# Patient Record
Sex: MALE | Race: WHITE | NOT HISPANIC OR LATINO | URBAN - NONMETROPOLITAN AREA
[De-identification: names, ages, dates, MRNs, and addresses within clinical notes are randomized per-mention and may not be internally consistent; named-entity substitution may affect disease eponyms.]

---

## 2020-01-23 ENCOUNTER — IMPORTED ENCOUNTER (OUTPATIENT)
Dept: URBAN - NONMETROPOLITAN AREA CLINIC 1 | Facility: CLINIC | Age: 65
End: 2020-01-23

## 2020-01-23 PROBLEM — H52.4: Noted: 2020-01-23

## 2020-01-23 PROBLEM — E11.9: Noted: 2020-01-23

## 2020-01-23 PROBLEM — H01.024: Noted: 2020-01-23

## 2020-01-23 PROBLEM — H25.13: Noted: 2020-01-23

## 2020-01-23 PROBLEM — H16.223: Noted: 2020-01-23

## 2020-01-23 PROBLEM — H01.021: Noted: 2020-01-23

## 2020-01-23 PROCEDURE — 99213 OFFICE O/P EST LOW 20 MIN: CPT

## 2020-01-23 PROCEDURE — 92015 DETERMINE REFRACTIVE STATE: CPT

## 2020-01-23 NOTE — PATIENT DISCUSSION
Deangelo OU- Discussed diagnosis in detail with patient- Discussed signs and symptoms of progression- Discussed UV protection- No treatment needed at this time - Progression noted today - Continue to monitorNIDDM 2012 with history of NPDR OU - Discussed diagnosis in detail with patient - Stressed importance of good blood sugar  - Recommend no sodas - Patient states last A1C was 6.8 and last blood sugae was 142- Dot blots noted OU last visit resolved OU on today's exam - OCT done previosly ordered by Dr. Stephan Rodas no edema noted on OCT OU- Optos done previously shows dot blots OU but stable - Letter to [facility] - Continue to monitorBlepharitis OU - Discussed diagnosis in detail with patient- Recommend patient using [employer] J baby shampoo to scrub lid daily- Continue to monitorDES OU- Discussed diagnosis in detail with patient- Discussed signs and symptoms of progression- Recommend patient drinking plenty of water and starting Omega 3’s - Recommend Refresh or Systane  throughout the day- Consider Restasis or plugs in the future if no improvement- Samples of Systane complete given today - Continue to monitorHyperopia/Astigmatism/Presbyopia OU- Discussed diagnosis in detail with patient- New glasses Rx given today - Continue to monitor; 's Notes: MR 1/23/20DFE 1/23/20OCT 3/22/18Optos 8/17/18

## 2021-08-11 ENCOUNTER — IMPORTED ENCOUNTER (OUTPATIENT)
Dept: URBAN - NONMETROPOLITAN AREA CLINIC 1 | Facility: CLINIC | Age: 66
End: 2021-08-11

## 2021-08-11 PROBLEM — H25.813: Noted: 2021-08-11

## 2021-08-11 PROBLEM — H01.024: Noted: 2021-08-11

## 2021-08-11 PROBLEM — H16.223: Noted: 2021-08-11

## 2021-08-11 PROBLEM — H52.4: Noted: 2021-08-11

## 2021-08-11 PROBLEM — H01.021: Noted: 2021-08-11

## 2021-08-11 PROBLEM — E11.3292: Noted: 2021-08-11

## 2021-08-11 PROBLEM — E11.9: Noted: 2021-08-11

## 2021-08-11 PROCEDURE — 92015 DETERMINE REFRACTIVE STATE: CPT

## 2021-08-11 PROCEDURE — 99213 OFFICE O/P EST LOW 20 MIN: CPT

## 2021-08-11 NOTE — PATIENT DISCUSSION
Cataracts OU- Discussed diagnosis in detail with patient- Discussed signs and symptoms of progression- Discussed UV protection- No treatment needed at this time - Continue to monitorNIDDM 2012 with history of NPDR OU - Discussed diagnosis in detail with patient - Stressed importance of good blood sugar  - Recommend no sodas - Patient states last A1C was 7.22- 1 MA noted in OS today - OCT done previosly ordered by Dr. Ada Arshad no edema noted on OCT OU- Optos done previously shows dot blots OU but stable - Letter to [facility] - Continue to monitorBlepharitis OU - Discussed diagnosis in detail with patient- Recommend patient using J [employer] J baby shampoo to scrub lid daily- Continue to monitorDES OU- Discussed diagnosis in detail with patient- Discussed signs and symptoms of progression- Recommend patient drinking plenty of water and starting Omega 3’s - Recommend Refresh or Systane  throughout the day- Consider Restasis or plugs in the future if no improvement- Samples of Systane complete given previously- Continue to monitorHyperopia/Astigmatism/Presbyopia OU- Discussed diagnosis in detail with patient- New glasses RX given today  - Continue to monitor; 's Notes: MR 1/23/20DFE 1/23/20OCT 3/22/18Optos 8/17/18

## 2022-04-10 ASSESSMENT — VISUAL ACUITY
OS_SC: 20/30-
OS_SC: 20/20
OD_SC: 20/30-1
OD_SC: 20/30-

## 2022-04-10 ASSESSMENT — TONOMETRY
OD_IOP_MMHG: 14
OS_IOP_MMHG: 14
OS_IOP_MMHG: 14
OD_IOP_MMHG: 14

## 2022-05-17 ENCOUNTER — EMERGENCY VISIT (OUTPATIENT)
Dept: URBAN - NONMETROPOLITAN AREA CLINIC 1 | Facility: CLINIC | Age: 67
End: 2022-05-17

## 2022-05-17 DIAGNOSIS — H35.81: ICD-10-CM

## 2022-05-17 DIAGNOSIS — E11.3293: ICD-10-CM

## 2022-05-17 PROCEDURE — 92134 CPTRZ OPH DX IMG PST SGM RTA: CPT

## 2022-05-17 PROCEDURE — 99214 OFFICE O/P EST MOD 30 MIN: CPT

## 2022-05-17 ASSESSMENT — TONOMETRY
OD_IOP_MMHG: 12
OS_IOP_MMHG: 12

## 2022-05-17 ASSESSMENT — VISUAL ACUITY
OS_CC: 20/40 BLURRY
OD_CC: 20/40+
OD_PH: 20/30
OD_BAT: 20/30-1
OS_BAT: 20/40-1

## 2022-05-17 NOTE — PATIENT DISCUSSION
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 Stressed the importance of strong glucose control. 2-3 MA's noted today in OS. Recommend consult with [facility], patient agrees with plan.

## 2022-09-12 NOTE — PROCEDURE NOTE: CLINICAL
PROCEDURE NOTE: Removal of Corneal FB at Slit Lamp OU. Diagnosis: Corneal Foreign Body. Anesthesia: Topical. The patient, the procedure, and the correct site were identified initially. Prior to treatment, the risks/benefits/alternatives were discussed. The patient wished to proceed with procedure. Corneal foreign body was removed using a 25 gauge needle at the slit lamp. Patient tolerated procedure well. There were no complications. Post-op instructions given. The residual rust ring was carefully buffed out of the corneal stroma. Patient tolerated procedure well. Florina Pollen

## 2022-09-14 NOTE — PATIENT DISCUSSION
CPM Ciprofloxacin drops QID OU x 4 days then D/C, consider restarting if need to continue to remove rust.

## 2022-09-14 NOTE — PATIENT DISCUSSION
Good appearance today, epi defects healing after FB removal on Monday. Small amount of rust removed at slit lamp OS today.

## 2022-09-20 NOTE — PATIENT DISCUSSION
Good appearance today, stable OD, small rust/fragment more superficial OS today. Discussed additional removal today vs waiting for more potential anterior movement, agreed to recheck next week and plan to remove final fragment if not dislodged on its own.

## 2022-09-22 ENCOUNTER — ESTABLISHED PATIENT (OUTPATIENT)
Dept: URBAN - NONMETROPOLITAN AREA CLINIC 1 | Facility: CLINIC | Age: 67
End: 2022-09-22

## 2022-09-22 DIAGNOSIS — H52.4: ICD-10-CM

## 2022-09-22 PROCEDURE — 92014 COMPRE OPH EXAM EST PT 1/>: CPT

## 2022-09-22 PROCEDURE — 92015 DETERMINE REFRACTIVE STATE: CPT

## 2022-09-22 ASSESSMENT — VISUAL ACUITY
OD_CC: 20/40+1
OS_CC: 20/40+2

## 2022-09-22 ASSESSMENT — TONOMETRY
OS_IOP_MMHG: 16
OD_IOP_MMHG: 16

## 2022-09-22 NOTE — PATIENT DISCUSSION
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 Stressed the importance of strong glucose control. 2-3 MA's noted today in OS. Patient is being followed by [facility].

## 2022-10-12 NOTE — PATIENT DISCUSSION
Good appearance today, stable OD, small rust/fragment more superficial OS today. Discussed additional removal today vs waiting for more potential anterior movement, agreed to recheck next week and plan to remove final fragment if not dislodged on its own. Soft, non-tender, no hepatosplenomegaly, normal bowel sounds

## 2022-10-12 NOTE — PATIENT DISCUSSION
ATs as needed currently OU. Muscle Hinge Flap Text: The defect edges were debeveled with a #15 scalpel blade.  Given the size, depth and location of the defect and the proximity to free margins a muscle hinge flap was deemed most appropriate.  Using a sterile surgical marker, an appropriate hinge flap was drawn incorporating the defect. The area thus outlined was incised with a #15 scalpel blade.  The skin margins were undermined to an appropriate distance in all directions utilizing iris scissors.

## 2023-09-25 ENCOUNTER — FOLLOW UP (OUTPATIENT)
Dept: URBAN - NONMETROPOLITAN AREA CLINIC 1 | Facility: CLINIC | Age: 68
End: 2023-09-25

## 2023-09-25 DIAGNOSIS — H25.813: ICD-10-CM

## 2023-09-25 DIAGNOSIS — H52.4: ICD-10-CM

## 2023-09-25 DIAGNOSIS — E11.3292: ICD-10-CM

## 2023-09-25 PROCEDURE — 92015 DETERMINE REFRACTIVE STATE: CPT

## 2023-09-25 PROCEDURE — 92250 FUNDUS PHOTOGRAPHY W/I&R: CPT

## 2023-09-25 PROCEDURE — 99213 OFFICE O/P EST LOW 20 MIN: CPT

## 2023-09-25 ASSESSMENT — VISUAL ACUITY
OD_CC: 20/25
OS_CC: 20/22-2

## 2023-09-25 ASSESSMENT — TONOMETRY
OS_IOP_MMHG: 14
OD_IOP_MMHG: 14

## 2024-09-26 ENCOUNTER — COMPREHENSIVE EXAM (OUTPATIENT)
Dept: URBAN - NONMETROPOLITAN AREA CLINIC 1 | Facility: CLINIC | Age: 69
End: 2024-09-26

## 2024-09-26 DIAGNOSIS — E11.3292: ICD-10-CM

## 2024-09-26 DIAGNOSIS — H52.4: ICD-10-CM

## 2024-09-26 DIAGNOSIS — H25.813: ICD-10-CM

## 2024-09-26 PROCEDURE — 92015 DETERMINE REFRACTIVE STATE: CPT

## 2024-09-26 PROCEDURE — 99213 OFFICE O/P EST LOW 20 MIN: CPT
